# Patient Record
Sex: FEMALE | Race: WHITE | NOT HISPANIC OR LATINO | Employment: OTHER | URBAN - METROPOLITAN AREA
[De-identification: names, ages, dates, MRNs, and addresses within clinical notes are randomized per-mention and may not be internally consistent; named-entity substitution may affect disease eponyms.]

---

## 2017-05-09 ENCOUNTER — HOSPITAL ENCOUNTER (EMERGENCY)
Facility: HOSPITAL | Age: 77
Discharge: HOME | End: 2017-05-10
Attending: EMERGENCY MEDICINE | Admitting: EMERGENCY MEDICINE

## 2017-05-09 DIAGNOSIS — S96.911A RIGHT ANKLE STRAIN, INITIAL ENCOUNTER: Primary | ICD-10-CM

## 2017-05-09 DIAGNOSIS — S39.012A BACK STRAIN, INITIAL ENCOUNTER: ICD-10-CM

## 2017-05-09 PROCEDURE — 99284 EMERGENCY DEPT VISIT MOD MDM: CPT

## 2017-05-10 ENCOUNTER — APPOINTMENT (OUTPATIENT)
Dept: CT IMAGING | Facility: HOSPITAL | Age: 77
End: 2017-05-10

## 2017-05-10 ENCOUNTER — APPOINTMENT (OUTPATIENT)
Dept: GENERAL RADIOLOGY | Facility: HOSPITAL | Age: 77
End: 2017-05-10

## 2017-05-10 VITALS
RESPIRATION RATE: 18 BRPM | SYSTOLIC BLOOD PRESSURE: 155 MMHG | HEART RATE: 86 BPM | HEIGHT: 61 IN | OXYGEN SATURATION: 99 % | WEIGHT: 293 LBS | DIASTOLIC BLOOD PRESSURE: 70 MMHG | BODY MASS INDEX: 55.32 KG/M2 | TEMPERATURE: 97.4 F

## 2017-05-10 PROCEDURE — 72110 X-RAY EXAM L-2 SPINE 4/>VWS: CPT

## 2017-05-10 PROCEDURE — 72131 CT LUMBAR SPINE W/O DYE: CPT

## 2017-05-10 PROCEDURE — 73610 X-RAY EXAM OF ANKLE: CPT

## 2017-05-10 RX ORDER — ASPIRIN 81 MG/1
81 TABLET ORAL DAILY
COMMUNITY

## 2017-05-10 RX ORDER — VALACYCLOVIR HYDROCHLORIDE 500 MG/1
1000 TABLET, FILM COATED ORAL 2 TIMES DAILY
COMMUNITY
End: 2019-09-17 | Stop reason: ALTCHOICE

## 2017-05-10 RX ORDER — FUROSEMIDE 20 MG/1
20 TABLET ORAL DAILY
COMMUNITY

## 2017-05-10 RX ORDER — ATORVASTATIN CALCIUM 20 MG/1
20 TABLET, FILM COATED ORAL DAILY
COMMUNITY

## 2017-05-10 RX ORDER — GABAPENTIN 100 MG/1
100 CAPSULE ORAL NIGHTLY
COMMUNITY

## 2017-05-10 RX ORDER — FLECAINIDE ACETATE 100 MG/1
100 TABLET ORAL
COMMUNITY

## 2017-05-10 RX ORDER — INSULIN GLARGINE 100 [IU]/ML
38 INJECTION, SOLUTION SUBCUTANEOUS NIGHTLY
COMMUNITY
End: 2019-09-17

## 2017-05-10 RX ORDER — HYDROCODONE BITARTRATE AND ACETAMINOPHEN 5; 325 MG/1; MG/1
1 TABLET ORAL EVERY 6 HOURS PRN
COMMUNITY
End: 2019-09-17

## 2017-05-10 RX ORDER — LISINOPRIL 10 MG/1
10 TABLET ORAL DAILY
COMMUNITY

## 2017-05-10 RX ORDER — POLYETHYLENE GLYCOL 3350 17 G/17G
17 POWDER, FOR SOLUTION ORAL DAILY
COMMUNITY
End: 2019-09-17 | Stop reason: ALTCHOICE

## 2017-05-10 RX ORDER — HYDROXYZINE HYDROCHLORIDE 10 MG/1
10 TABLET, FILM COATED ORAL 3 TIMES DAILY PRN
COMMUNITY
End: 2019-09-17 | Stop reason: ALTCHOICE

## 2017-05-10 RX ORDER — TRAMADOL HYDROCHLORIDE 50 MG/1
50 TABLET ORAL EVERY 6 HOURS PRN
COMMUNITY
End: 2019-09-17 | Stop reason: ALTCHOICE

## 2017-05-10 RX ORDER — MELOXICAM 7.5 MG/1
7.5 TABLET ORAL DAILY
COMMUNITY
End: 2019-09-17 | Stop reason: ALTCHOICE

## 2018-05-15 ENCOUNTER — APPOINTMENT (OUTPATIENT)
Dept: GENERAL RADIOLOGY | Facility: HOSPITAL | Age: 78
End: 2018-05-15

## 2018-05-15 ENCOUNTER — HOSPITAL ENCOUNTER (EMERGENCY)
Facility: HOSPITAL | Age: 78
Discharge: HOME | End: 2018-05-15
Admitting: EMERGENCY MEDICINE

## 2018-05-15 VITALS
OXYGEN SATURATION: 95 % | BODY MASS INDEX: 53.05 KG/M2 | HEART RATE: 69 BPM | DIASTOLIC BLOOD PRESSURE: 64 MMHG | TEMPERATURE: 97.6 F | SYSTOLIC BLOOD PRESSURE: 115 MMHG | WEIGHT: 281 LBS | RESPIRATION RATE: 16 BRPM | HEIGHT: 61 IN

## 2018-05-15 DIAGNOSIS — S90.31XA CONTUSION OF RIGHT FOOT, INITIAL ENCOUNTER: Primary | ICD-10-CM

## 2018-05-15 PROCEDURE — 73630 X-RAY EXAM OF FOOT: CPT

## 2018-05-15 PROCEDURE — 99284 EMERGENCY DEPT VISIT MOD MDM: CPT

## 2018-05-15 RX ORDER — HYDROCODONE BITARTRATE AND ACETAMINOPHEN 5; 325 MG/1; MG/1
1 TABLET ORAL ONCE
Status: DISCONTINUED | OUTPATIENT
Start: 2018-05-15 | End: 2018-05-15 | Stop reason: HOSPADM

## 2018-05-15 NOTE — PROGRESS NOTES
Discharge Planning Assessment   [facility]     Patient Name: Nancy Fournier  MRN: [id number]  Today's Date: 5/15/2018    Admit Date: 5/15/2018          Discharge Needs Assessment     Row Name 05/15/18 1407       Living Environment    Primary Care Provided by homecare agency;self    Provides Primary Care For no one, unable/limited ability to care for self    Quality of Family Relationships involved    Able to Return to Prior Arrangements no       Resource/Environmental Concerns    Transportation Concerns other (see comments)   [facility] transport ambulance       Transition Planning    Patient/Family Anticipates Transition to inpatient rehabilitation facility    Patient/Family Anticipated Services at Transition skilled nursing    Transportation Anticipated health plan transportation   [facility]       Discharge Needs Assessment    Readmission Within the Last 30 Days no previous admission in last 30 days    Concerns to be Addressed discharge planning;home safety    Equipment Currently Used at Home wheelchair;walker, standard;lift device    Anticipated Changes Related to Illness inability to care for self    Outpatient/Agency/Support Group Needs skilled nursing facility    Discharge Facility/Level of Care Needs nursing facility, skilled    Offered/Gave Vendor List no    Patient's Choice of Community Agency(s) Patient requests to be transferred to SNF short term    Current Discharge Risk dependent with mobility/activities of daily living            Discharge Plan     Row Name 05/15/18 1411       Plan    Plan Pt to be transported to [facility] via EMS    Patient/Family in Agreement with Plan yes    Final Discharge Disposition Code 03 - skilled nursing facility (SNF)        Destination     No service coordination in this encounter.      Durable Medical Equipment     No service coordination in this encounter.      Dialysis/Infusion     No service coordination in this encounter.      Home Medical Care      No service coordination in this encounter.      Social Care     No service coordination in this encounter.                Demographic Summary    No documentation.           Functional Status    No documentation.           Psychosocial    No documentation.           Abuse/Neglect    No documentation.           Legal    No documentation.           Substance Abuse    No documentation.           Patient Forms    No documentation.         Herminia Carbone RN

## 2018-05-15 NOTE — ED PROVIDER NOTES
Pt presents to the ED c/o R foot pain s/p it slipping and forcefully striking the ground last night.  Pt reports weight bearing exacerbates the pain.    On exam, pt is A+Ox3 and in NAD.  Pt's R foot has mild TTP over R lateral heel with no deformity.  Pt's cardio has intact distal pulses.    XR R Foot shows osteopenia, NAD.  Plan for discharge.    MD ATTESTATION NOTE    The SERJIO and I have discussed this patient's history, physical exam, and treatment plan.  I have reviewed the documentation and personally had a face to face interaction with the patient. I affirm the documentation and agree with the treatment and plan.  The attached note describes my personal findings.    Documentation assistance provided by esme Chacon for Dr. Auguste. Information recorded by the scribe was done at my direction and has been verified and validated by me.     Carmen Chacon  05/15/18 1012       Feroz Auguste MD  05/16/18 8942

## 2018-05-15 NOTE — ED NOTES
Report given to Ghislaine at [facility]     Elinor Zendejas, ODALIS  05/15/18 9855

## 2018-05-15 NOTE — ED PROVIDER NOTES
EMERGENCY DEPARTMENT ENCOUNTER    CHIEF COMPLAINT  Chief Complaint: foot pain  History given by: patient  History limited by: N/A  Room Number: 19/19  PMD: Kimberli Silva MD      HPI:  Pt is a 77 y.o. female who is a nursing home resident. She presents to the ED c/o foot pain. She states that last night, as she was using her left foot to take off the shoe from her right foot, her right foot slipped out of the shoe and struck the ground. Since then, she has had intermittent right plantar foot pain that is exacerbated by bearing weight on RLE. She denies falling, right ankle pain, sensory loss, fevers, and sustaining any other injury. Nursing home staff applied ace wrap to pt's right foot. Pt states that she is currently undergoing treatment for chronic groin infection. Pt has no other complaints at this time.     Duration: started last night  Timing: intermittent  Location: right plantar foot  Radiation: none  Quality: pain  Intensity/Severity: moderate  Progression: unchanged  Associated Symptoms: none  Aggravating Factors: bearing weight on RLE  Alleviating Factors: keeping weight off of RLE  Previous Episodes: none mentioned  Treatment before arrival: Nursing home staff applied ace wrap to pt's right foot.        PAST MEDICAL HISTORY  Active Ambulatory Problems     Diagnosis Date Noted   • No Active Ambulatory Problems     Resolved Ambulatory Problems     Diagnosis Date Noted   • No Resolved Ambulatory Problems     Past Medical History:   Diagnosis Date   • A-fib    • Chronic back pain    • Depression    • Diabetes mellitus    • Hyperlipidemia    • Hypertension    • Osteoarthritis        PAST SURGICAL HISTORY  Past Surgical History:   Procedure Laterality Date   • CHOLECYSTECTOMY     • HERNIA REPAIR     • SHOULDER SURGERY     • TONSILLECTOMY         FAMILY HISTORY  History reviewed. No pertinent family history.    SOCIAL HISTORY  Social History     Social History   • Marital status:      Spouse name: N/A   •  Number of children: N/A   • Years of education: N/A     Occupational History   • Not on file.     Social History Main Topics   • Smoking status: Former Smoker   • Smokeless tobacco: Not on file   • Alcohol use No   • Drug use: No   • Sexual activity: Defer     Other Topics Concern   • Not on file     Social History Narrative   • No narrative on file         ALLERGIES  Ceclor [cefaclor]        REVIEW OF SYSTEMS  Review of Systems   Constitutional: Negative for chills and fever.   HENT: Negative for sore throat.    Respiratory: Negative for shortness of breath.    Cardiovascular: Negative for chest pain.   Gastrointestinal: Negative for nausea and vomiting.   Genitourinary: Negative for dysuria.   Musculoskeletal: Negative for back pain.        Right plantar foot pain   Neurological: Negative for numbness.   Psychiatric/Behavioral: The patient is not nervous/anxious.    All other systems reviewed and are negative.          PHYSICAL EXAM  ED Triage Vitals   Temp Heart Rate Resp BP SpO2   05/15/18 0903 05/15/18 0846 05/15/18 0903 05/15/18 0846 05/15/18 0846   97.6 °F (36.4 °C) 72 16 150/74 96 % WNL      Temp src Heart Rate Source Patient Position BP Location FiO2 (%)   05/15/18 0903 -- -- -- --   Tympanic           Physical Exam   Constitutional: She is oriented to person, place, and time. No distress.   HENT:   Head: Normocephalic and atraumatic.   Neck: Normal range of motion. Neck supple.   Cardiovascular: Normal rate, regular rhythm and normal heart sounds.    Pulses:       Dorsalis pedis pulses are 2+ on the right side.        Posterior tibial pulses are 2+ on the right side.   Pulmonary/Chest: Effort normal and breath sounds normal. No respiratory distress.   Musculoskeletal:        Right ankle: She exhibits normal range of motion and no swelling. No tenderness.        Right foot: There is normal range of motion, no tenderness and no swelling.   NV intact distally to RLE   Neurological: She is alert and oriented  to person, place, and time. She has normal motor skills and normal sensation.   Skin: Skin is warm and dry.   Nursing note and vitals reviewed.        RADIOLOGY     XR Foot 3+ View Right (Preliminary result) Result time: 05/15/18 09:46:17   Preliminary result by Darin Ordaz MD (05/15/18 09:46:17)   Impression:   Osteopenia. Chronic changes without evidence for fracture or  acute abnormality.      Narrative:   RIGHT FOOT: 3 VIEWS     HISTORY: Fall yesterday with right lateral foot pain.     FINDINGS: Midfoot alignment appears normal. The bones are osteopenic.  Chronic degenerative changes are present in the 1st MTP joint where  there is spur formation. No fracture or acute osseous abnormality is  evident. 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 Degenerative heel spurs are  present posteriorly and inferiorly. Microvascular calcifications are  noted.            I ordered the above noted radiological studies and reviewed the images on the PACS system.        MEDICAL RECORD REVIEW        PROCEDURES        PROGRESS AND CONSULTS  0906- Ordered norco for pain. Ordered right foot xray for further evaluation.     0952- Reviewed pt's history and workup with Dr. Auguste (ER physician). After a bedside evaluation, Dr. Auguste agrees with the plan of care.    0955- Rechecked pt. She is resting comfortably and is in no acute distress. Discussed with pt about right foot xray findings (osteopenia, no fracture), diagnosis, and plan for discharge. Instructed to f/u with PMD for recheck. RTER warnings given. Pt understands and agrees with plan. Addressed all questions.         COURSE & MEDICAL DECISION MAKING  Pertinent Imaging studies that were ordered and reviewed are noted above.  Results were reviewed/discussed with the patient and they were also made aware of online assess.   Pt also made aware that some labs, such as cultures, will not be resulted during ER visit and follow up with PMD is necessary.         MEDICATIONS GIVEN IN  "ER  Medications   HYDROcodone-acetaminophen (NORCO) 5-325 MG per tablet 1 tablet (1 tablet Oral Not Given 5/15/18 0951)             DISPOSITION VITALS  /74   Pulse 72   Temp 97.6 °F (36.4 °C) (Tympanic)   Resp 16   Ht 154.9 cm (61\")   Wt 127 kg (281 lb)   SpO2 96%   BMI 53.09 kg/m²           DISPOSITION  DISCHARGE    Discussed all results and noted any abnormalities with patient.  Discussed absoute need to recheck abnormalities and condition with PMD.     Reviewed implications of results, diagnosis, meds, responsibility to follow up, warning signs and symptoms of possible worsening, potential complications and reasons to return to ER with patient    Discussed plan for discharge, as there is no emergent indication for admission.  Pt is agreeable and understands need for follow up and repeat testing.  Pt is aware that discharge does not mean that nothing is wrong but it indicates no emergency is present and they must continue care with PMD.  Pt is discharged with instructions to follow up with primary care doctor to have their blood pressure rechecked.       DIAGNOSIS  Final diagnoses:   Contusion of right foot, initial encounter       FOLLOW UP   Kimberli Silva MD  [address] [place] [address]  [phone]    Schedule an appointment as soon as possible for a visit         I personally reviewed the past medical history, past surgical history, social history, family history, current medications and allergies as they appear in this chart.  The scribe's note accurately reflects the work and decisions made by me.           Documentation assistance provided by esme Garcia for ARIC Norris.  Information recorded by the scribe was done at my direction and has been verified and validated by me.     Jin Garcia  05/15/18 1020       ARIC Larson  05/15/18 1343    "

## 2019-09-17 ENCOUNTER — APPOINTMENT (OUTPATIENT)
Dept: CT IMAGING | Facility: HOSPITAL | Age: 79
End: 2019-09-17

## 2019-09-17 ENCOUNTER — APPOINTMENT (OUTPATIENT)
Dept: GENERAL RADIOLOGY | Facility: HOSPITAL | Age: 79
End: 2019-09-17

## 2019-09-17 ENCOUNTER — HOSPITAL ENCOUNTER (INPATIENT)
Facility: HOSPITAL | Age: 79
LOS: 6 days | Discharge: NURSING, REHAB OR LONG-TERM CARE | End: 2019-09-23
Attending: EMERGENCY MEDICINE | Admitting: INTERNAL MEDICINE

## 2019-09-17 DIAGNOSIS — S86.911A STRAIN OF KNEE, BILATERAL: ICD-10-CM
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
## 2019-09-17 DIAGNOSIS — D72.829 LEUKOCYTOSIS, UNSPECIFIED TYPE: ICD-10-CM

## 2019-09-17 DIAGNOSIS — S76.011A HIP STRAIN, RIGHT, INITIAL ENCOUNTER: ICD-10-CM

## 2019-09-17 DIAGNOSIS — M54.5 ACUTE LOW BACK PAIN, UNSPECIFIED BACK PAIN LATERALITY, WITH SCIATICA PRESENCE UNSPECIFIED: ICD-10-CM

## 2019-09-17 DIAGNOSIS — R11.0 NAUSEA: ICD-10-CM

## 2019-09-17 DIAGNOSIS — R29.898 COMPLAINTS OF LEG WEAKNESS: ICD-10-CM

## 2019-09-17 DIAGNOSIS — S86.912A STRAIN OF KNEE, BILATERAL: ICD-10-CM

## 2019-09-17 PROBLEM — M54.9 CHRONIC BACK PAIN: Status: ACTIVE | Noted: 2019-09-17

## 2019-09-17 PROBLEM — I10 HYPERTENSION: Status: ACTIVE | Noted: 2019-09-17

## 2019-09-17 PROBLEM — I48.91 A-FIB (HCC): Status: ACTIVE | Noted: 2019-09-17

## 2019-09-17 PROBLEM — E11.9 DIABETES MELLITUS (HCC): Status: ACTIVE | Noted: 2019-09-17

## 2019-09-17 PROBLEM — M54.9 ACUTE BACK PAIN: Status: ACTIVE | Noted: 2019-09-17

## 2019-09-17 PROBLEM — G89.29 CHRONIC BACK PAIN: Status: ACTIVE | Noted: 2019-09-17

## 2019-09-17 PROBLEM — T07.XXXA WOUNDS, MULTIPLE: Status: ACTIVE | Noted: 2019-09-17

## 2019-09-17 LAB
ALBUMIN SERPL-MCNC: 3.9 G/DL (ref 3.5–5.2)
ALBUMIN/GLOB SERPL: 1.6 G/DL
ALP SERPL-CCNC: 104 U/L (ref 39–117)
ALT SERPL W P-5'-P-CCNC: 19 U/L (ref 1–33)
ANION GAP SERPL CALCULATED.3IONS-SCNC: 12.7 MMOL/L (ref 5–15)
AST SERPL-CCNC: 23 U/L (ref 1–32)
BACTERIA UR QL AUTO: ABNORMAL /HPF
BASOPHILS # BLD AUTO: 0.05 10*3/MM3 (ref 0–0.2)
BASOPHILS NFR BLD AUTO: 0.3 % (ref 0–1.5)
BILIRUB SERPL-MCNC: 0.6 MG/DL (ref 0.2–1.2)
BILIRUB UR QL STRIP: NEGATIVE
BUN BLD-MCNC: 31 MG/DL (ref 8–23)
BUN/CREAT SERPL: 38.8 (ref 7–25)
CALCIUM SPEC-SCNC: 9.2 MG/DL (ref 8.6–10.5)
CHLORIDE SERPL-SCNC: 98 MMOL/L (ref 98–107)
CLARITY UR: ABNORMAL
CO2 SERPL-SCNC: 31.3 MMOL/L (ref 22–29)
COLOR UR: YELLOW
CREAT BLD-MCNC: 0.8 MG/DL (ref 0.57–1)
DEPRECATED RDW RBC AUTO: 49 FL (ref 37–54)
EOSINOPHIL # BLD AUTO: 0.05 10*3/MM3 (ref 0–0.4)
EOSINOPHIL NFR BLD AUTO: 0.3 % (ref 0.3–6.2)
ERYTHROCYTE [DISTWIDTH] IN BLOOD BY AUTOMATED COUNT: 13.4 % (ref 12.3–15.4)
GFR SERPL CREATININE-BSD FRML MDRD: 69 ML/MIN/1.73
GLOBULIN UR ELPH-MCNC: 2.5 GM/DL
GLUCOSE BLD-MCNC: 161 MG/DL (ref 65–99)
GLUCOSE BLDC GLUCOMTR-MCNC: 167 MG/DL (ref 70–130)
GLUCOSE BLDC GLUCOMTR-MCNC: 225 MG/DL (ref 70–130)
GLUCOSE UR STRIP-MCNC: NEGATIVE MG/DL
HCT VFR BLD AUTO: 43.5 % (ref 34–46.6)
HGB BLD-MCNC: 13.8 G/DL (ref 12–15.9)
HGB UR QL STRIP.AUTO: ABNORMAL
HYALINE CASTS UR QL AUTO: ABNORMAL /LPF
IMM GRANULOCYTES # BLD AUTO: 0.14 10*3/MM3 (ref 0–0.05)
IMM GRANULOCYTES NFR BLD AUTO: 0.9 % (ref 0–0.5)
KETONES UR QL STRIP: NEGATIVE
LEUKOCYTE ESTERASE UR QL STRIP.AUTO: NEGATIVE
LYMPHOCYTES # BLD AUTO: 1.34 10*3/MM3 (ref 0.7–3.1)
LYMPHOCYTES NFR BLD AUTO: 8.2 % (ref 19.6–45.3)
MAGNESIUM SERPL-MCNC: 1.8 MG/DL (ref 1.6–2.4)
MCH RBC QN AUTO: 31.4 PG (ref 26.6–33)
MCHC RBC AUTO-ENTMCNC: 31.7 G/DL (ref 31.5–35.7)
MCV RBC AUTO: 98.9 FL (ref 79–97)
MONOCYTES # BLD AUTO: 1.22 10*3/MM3 (ref 0.1–0.9)
MONOCYTES NFR BLD AUTO: 7.5 % (ref 5–12)
NEUTROPHILS # BLD AUTO: 13.53 10*3/MM3 (ref 1.7–7)
NEUTROPHILS NFR BLD AUTO: 82.8 % (ref 42.7–76)
NITRITE UR QL STRIP: NEGATIVE
NRBC BLD AUTO-RTO: 0 /100 WBC (ref 0–0.2)
PH UR STRIP.AUTO: 8.5 [PH] (ref 5–8)
PLATELET # BLD AUTO: 258 10*3/MM3 (ref 140–450)
PMV BLD AUTO: 10.3 FL (ref 6–12)
POTASSIUM BLD-SCNC: 4.4 MMOL/L (ref 3.5–5.2)
PROT SERPL-MCNC: 6.4 G/DL (ref 6–8.5)
PROT UR QL STRIP: NEGATIVE
RBC # BLD AUTO: 4.4 10*6/MM3 (ref 3.77–5.28)
RBC # UR: ABNORMAL /HPF
REF LAB TEST METHOD: ABNORMAL
SODIUM BLD-SCNC: 142 MMOL/L (ref 136–145)
SP GR UR STRIP: 1.02 (ref 1–1.03)
SQUAMOUS #/AREA URNS HPF: ABNORMAL /HPF
UROBILINOGEN UR QL STRIP: ABNORMAL
WBC NRBC COR # BLD: 16.33 10*3/MM3 (ref 3.4–10.8)
WBC UR QL AUTO: ABNORMAL /HPF

## 2019-09-17 PROCEDURE — 73523 X-RAY EXAM HIPS BI 5/> VIEWS: CPT

## 2019-09-17 PROCEDURE — 81001 URINALYSIS AUTO W/SCOPE: CPT | Performed by: EMERGENCY MEDICINE

## 2019-09-17 PROCEDURE — 82962 GLUCOSE BLOOD TEST: CPT

## 2019-09-17 PROCEDURE — 25010000002 HYDROMORPHONE PER 4 MG: Performed by: EMERGENCY MEDICINE

## 2019-09-17 PROCEDURE — 25010000002 HYDROMORPHONE PER 4 MG: Performed by: INTERNAL MEDICINE

## 2019-09-17 PROCEDURE — 93010 ELECTROCARDIOGRAM REPORT: CPT | Performed by: INTERNAL MEDICINE

## 2019-09-17 PROCEDURE — 72110 X-RAY EXAM L-2 SPINE 4/>VWS: CPT

## 2019-09-17 PROCEDURE — P9612 CATHETERIZE FOR URINE SPEC: HCPCS

## 2019-09-17 PROCEDURE — 99285 EMERGENCY DEPT VISIT HI MDM: CPT

## 2019-09-17 PROCEDURE — 85025 COMPLETE CBC W/AUTO DIFF WBC: CPT | Performed by: EMERGENCY MEDICINE

## 2019-09-17 PROCEDURE — 73560 X-RAY EXAM OF KNEE 1 OR 2: CPT

## 2019-09-17 PROCEDURE — 93005 ELECTROCARDIOGRAM TRACING: CPT | Performed by: EMERGENCY MEDICINE

## 2019-09-17 PROCEDURE — 83735 ASSAY OF MAGNESIUM: CPT | Performed by: EMERGENCY MEDICINE

## 2019-09-17 PROCEDURE — 25010000002 ONDANSETRON PER 1 MG: Performed by: EMERGENCY MEDICINE

## 2019-09-17 PROCEDURE — 80053 COMPREHEN METABOLIC PANEL: CPT | Performed by: EMERGENCY MEDICINE

## 2019-09-17 RX ORDER — ONDANSETRON 4 MG/1
4 TABLET, FILM COATED ORAL EVERY 8 HOURS PRN
COMMUNITY

## 2019-09-17 RX ORDER — NICOTINE POLACRILEX 4 MG
15 LOZENGE BUCCAL
Status: DISCONTINUED | OUTPATIENT
Start: 2019-09-17 | End: 2019-09-23 | Stop reason: HOSPADM

## 2019-09-17 RX ORDER — LORAZEPAM 0.5 MG/1
0.5 TABLET ORAL EVERY 8 HOURS PRN
Status: ON HOLD | COMMUNITY
Start: 2019-09-10 | End: 2019-09-23

## 2019-09-17 RX ORDER — BETAMETHASONE DIPROPIONATE 0.5 MG/G
1 CREAM TOPICAL 2 TIMES DAILY
COMMUNITY

## 2019-09-17 RX ORDER — ASPIRIN 81 MG/1
81 TABLET ORAL DAILY
Status: DISCONTINUED | OUTPATIENT
Start: 2019-09-18 | End: 2019-09-23 | Stop reason: HOSPADM

## 2019-09-17 RX ORDER — BETAMETHASONE DIPROPIONATE 0.05 %
OINTMENT (GRAM) TOPICAL 2 TIMES DAILY
COMMUNITY
End: 2019-09-17 | Stop reason: ALTCHOICE

## 2019-09-17 RX ORDER — SODIUM CHLORIDE 0.9 % (FLUSH) 0.9 %
10 SYRINGE (ML) INJECTION EVERY 12 HOURS SCHEDULED
Status: DISCONTINUED | OUTPATIENT
Start: 2019-09-17 | End: 2019-09-23 | Stop reason: HOSPADM

## 2019-09-17 RX ORDER — HYDROMORPHONE HYDROCHLORIDE 1 MG/ML
0.5 INJECTION, SOLUTION INTRAMUSCULAR; INTRAVENOUS; SUBCUTANEOUS ONCE
Status: COMPLETED | OUTPATIENT
Start: 2019-09-17 | End: 2019-09-17

## 2019-09-17 RX ORDER — PSEUDOEPHEDRINE HCL 30 MG
2 TABLET ORAL 2 TIMES DAILY
COMMUNITY

## 2019-09-17 RX ORDER — BUPROPION HYDROCHLORIDE 75 MG/1
150 TABLET ORAL DAILY
COMMUNITY

## 2019-09-17 RX ORDER — SODIUM CHLORIDE 0.9 % (FLUSH) 0.9 %
10 SYRINGE (ML) INJECTION AS NEEDED
Status: DISCONTINUED | OUTPATIENT
Start: 2019-09-17 | End: 2019-09-23 | Stop reason: HOSPADM

## 2019-09-17 RX ORDER — FLECAINIDE ACETATE 50 MG/1
100 TABLET ORAL NIGHTLY
Status: DISCONTINUED | OUTPATIENT
Start: 2019-09-17 | End: 2019-09-23 | Stop reason: HOSPADM

## 2019-09-17 RX ORDER — SERTRALINE HYDROCHLORIDE 100 MG/1
150 TABLET, FILM COATED ORAL DAILY
COMMUNITY

## 2019-09-17 RX ORDER — CASTOR OIL AND BALSAM, PERU 788; 87 MG/G; MG/G
OINTMENT TOPICAL 2 TIMES DAILY PRN
COMMUNITY

## 2019-09-17 RX ORDER — FEXOFENADINE HYDROCHLORIDE 60 MG/1
60 TABLET, FILM COATED ORAL DAILY
COMMUNITY

## 2019-09-17 RX ORDER — LISINOPRIL 10 MG/1
10 TABLET ORAL DAILY
Status: DISCONTINUED | OUTPATIENT
Start: 2019-09-18 | End: 2019-09-23 | Stop reason: HOSPADM

## 2019-09-17 RX ORDER — HYDROCODONE BITARTRATE AND ACETAMINOPHEN 7.5; 325 MG/1; MG/1
1 TABLET ORAL ONCE
Status: COMPLETED | OUTPATIENT
Start: 2019-09-17 | End: 2019-09-17

## 2019-09-17 RX ORDER — ATORVASTATIN CALCIUM 20 MG/1
20 TABLET, FILM COATED ORAL DAILY
Status: DISCONTINUED | OUTPATIENT
Start: 2019-09-18 | End: 2019-09-23 | Stop reason: HOSPADM

## 2019-09-17 RX ORDER — ONDANSETRON 2 MG/ML
4 INJECTION INTRAMUSCULAR; INTRAVENOUS ONCE
Status: COMPLETED | OUTPATIENT
Start: 2019-09-17 | End: 2019-09-17

## 2019-09-17 RX ORDER — BUPROPION HYDROCHLORIDE 75 MG/1
150 TABLET ORAL DAILY
Status: DISCONTINUED | OUTPATIENT
Start: 2019-09-18 | End: 2019-09-23 | Stop reason: HOSPADM

## 2019-09-17 RX ORDER — NUT.TX.GLUCOSE INTOLERANCE,SOY
30 BAR ORAL 3 TIMES DAILY
COMMUNITY

## 2019-09-17 RX ORDER — FLUTICASONE PROPIONATE 50 MCG
2 SPRAY, SUSPENSION (ML) NASAL DAILY
Status: DISCONTINUED | OUTPATIENT
Start: 2019-09-18 | End: 2019-09-23 | Stop reason: HOSPADM

## 2019-09-17 RX ORDER — TROLAMINE SALICYLATE 10 G/100G
CREAM TOPICAL AS NEEDED
COMMUNITY
End: 2019-09-17

## 2019-09-17 RX ORDER — LORAZEPAM 0.5 MG/1
0.5 TABLET ORAL EVERY 8 HOURS PRN
Status: DISCONTINUED | OUTPATIENT
Start: 2019-09-17 | End: 2019-09-23 | Stop reason: HOSPADM

## 2019-09-17 RX ORDER — HYDROCODONE BITARTRATE AND ACETAMINOPHEN 7.5; 325 MG/1; MG/1
1 TABLET ORAL 3 TIMES DAILY
COMMUNITY
End: 2019-09-23 | Stop reason: HOSPADM

## 2019-09-17 RX ORDER — HYDROCODONE BITARTRATE AND ACETAMINOPHEN 7.5; 325 MG/1; MG/1
1 TABLET ORAL EVERY 6 HOURS PRN
Status: DISCONTINUED | OUTPATIENT
Start: 2019-09-17 | End: 2019-09-19

## 2019-09-17 RX ORDER — FUROSEMIDE 20 MG/1
20 TABLET ORAL DAILY
Status: DISCONTINUED | OUTPATIENT
Start: 2019-09-18 | End: 2019-09-23 | Stop reason: HOSPADM

## 2019-09-17 RX ORDER — ONDANSETRON 4 MG/1
4 TABLET, ORALLY DISINTEGRATING ORAL ONCE
Status: DISCONTINUED | OUTPATIENT
Start: 2019-09-17 | End: 2019-09-17

## 2019-09-17 RX ORDER — DEXTROSE MONOHYDRATE 25 G/50ML
25 INJECTION, SOLUTION INTRAVENOUS
Status: DISCONTINUED | OUTPATIENT
Start: 2019-09-17 | End: 2019-09-23 | Stop reason: HOSPADM

## 2019-09-17 RX ORDER — SODIUM CHLORIDE 9 MG/ML
50 INJECTION, SOLUTION INTRAVENOUS CONTINUOUS
Status: DISCONTINUED | OUTPATIENT
Start: 2019-09-17 | End: 2019-09-23 | Stop reason: HOSPADM

## 2019-09-17 RX ORDER — TRIAMCINOLONE ACETONIDE 1 MG/G
CREAM TOPICAL EVERY 12 HOURS SCHEDULED
Status: DISCONTINUED | OUTPATIENT
Start: 2019-09-17 | End: 2019-09-23 | Stop reason: HOSPADM

## 2019-09-17 RX ORDER — ONDANSETRON 2 MG/ML
4 INJECTION INTRAMUSCULAR; INTRAVENOUS EVERY 6 HOURS PRN
Status: DISCONTINUED | OUTPATIENT
Start: 2019-09-17 | End: 2019-09-23 | Stop reason: HOSPADM

## 2019-09-17 RX ORDER — FLUTICASONE PROPIONATE 50 MCG
2 SPRAY, SUSPENSION (ML) NASAL DAILY
COMMUNITY

## 2019-09-17 RX ORDER — ARGININE/GLUTAMINE/CALCIUM BMB 7G-7G-1.5G
1 POWDER IN PACKET (EA) ORAL 3 TIMES DAILY
COMMUNITY

## 2019-09-17 RX ORDER — INSULIN GLARGINE 100 [IU]/ML
38 INJECTION, SOLUTION SUBCUTANEOUS NIGHTLY
Status: DISCONTINUED | OUTPATIENT
Start: 2019-09-17 | End: 2019-09-23 | Stop reason: HOSPADM

## 2019-09-17 RX ORDER — OLOPATADINE HYDROCHLORIDE 1 MG/ML
1 SOLUTION/ DROPS OPHTHALMIC DAILY
COMMUNITY

## 2019-09-17 RX ORDER — METOPROLOL SUCCINATE 25 MG/1
25 TABLET, EXTENDED RELEASE ORAL
Status: DISCONTINUED | OUTPATIENT
Start: 2019-09-18 | End: 2019-09-21

## 2019-09-17 RX ORDER — HYDROMORPHONE HYDROCHLORIDE 1 MG/ML
0.5 INJECTION, SOLUTION INTRAMUSCULAR; INTRAVENOUS; SUBCUTANEOUS
Status: DISCONTINUED | OUTPATIENT
Start: 2019-09-17 | End: 2019-09-23 | Stop reason: HOSPADM

## 2019-09-17 RX ORDER — ACETAMINOPHEN 500 MG
500 TABLET ORAL EVERY 4 HOURS PRN
COMMUNITY

## 2019-09-17 RX ORDER — ASPIRIN 81 MG
1 TABLET, DELAYED RELEASE (ENTERIC COATED) ORAL DAILY
COMMUNITY

## 2019-09-17 RX ORDER — GABAPENTIN 100 MG/1
100 CAPSULE ORAL NIGHTLY
Status: DISCONTINUED | OUTPATIENT
Start: 2019-09-17 | End: 2019-09-23 | Stop reason: HOSPADM

## 2019-09-17 RX ORDER — CICLOPIROX OLAMINE 7.7 MG/100ML
SUSPENSION TOPICAL EVERY 12 HOURS SCHEDULED
COMMUNITY
End: 2019-09-17 | Stop reason: ALTCHOICE

## 2019-09-17 RX ORDER — METFORMIN HYDROCHLORIDE 500 MG/1
2000 TABLET, EXTENDED RELEASE ORAL DAILY
COMMUNITY

## 2019-09-17 RX ORDER — METOPROLOL SUCCINATE 25 MG/1
25 TABLET, EXTENDED RELEASE ORAL 2 TIMES DAILY
COMMUNITY
End: 2019-09-23 | Stop reason: HOSPADM

## 2019-09-17 RX ORDER — KETOTIFEN FUMARATE 0.35 MG/ML
1 SOLUTION/ DROPS OPHTHALMIC 2 TIMES DAILY
Status: DISCONTINUED | OUTPATIENT
Start: 2019-09-17 | End: 2019-09-23 | Stop reason: HOSPADM

## 2019-09-17 RX ORDER — HYDROCODONE BITARTRATE AND ACETAMINOPHEN 7.5; 325 MG/1; MG/1
1 TABLET ORAL EVERY 6 HOURS PRN
COMMUNITY
End: 2019-09-23 | Stop reason: HOSPADM

## 2019-09-17 RX ORDER — ACETAMINOPHEN 500 MG
500 TABLET ORAL EVERY 4 HOURS PRN
Status: DISCONTINUED | OUTPATIENT
Start: 2019-09-17 | End: 2019-09-23 | Stop reason: HOSPADM

## 2019-09-17 RX ADMIN — HYDROMORPHONE HYDROCHLORIDE 0.5 MG: 1 INJECTION, SOLUTION INTRAMUSCULAR; INTRAVENOUS; SUBCUTANEOUS at 17:06

## 2019-09-17 RX ADMIN — HYDROCODONE BITARTRATE AND ACETAMINOPHEN 1 TABLET: 7.5; 325 TABLET ORAL at 14:35

## 2019-09-17 RX ADMIN — HYDROMORPHONE HYDROCHLORIDE 0.5 MG: 10 INJECTION INTRAMUSCULAR; INTRAVENOUS; SUBCUTANEOUS at 21:55

## 2019-09-17 RX ADMIN — SODIUM CHLORIDE 250 ML: 9 INJECTION, SOLUTION INTRAVENOUS at 17:06

## 2019-09-17 RX ADMIN — ONDANSETRON 4 MG: 2 INJECTION INTRAMUSCULAR; INTRAVENOUS at 17:05

## 2019-09-17 RX ADMIN — SODIUM CHLORIDE 125 ML/HR: 9 INJECTION, SOLUTION INTRAVENOUS at 17:06

## 2019-09-18 ENCOUNTER — APPOINTMENT (OUTPATIENT)
Dept: CT IMAGING | Facility: HOSPITAL | Age: 79
End: 2019-09-18

## 2019-09-18 PROBLEM — S32.009A: Status: ACTIVE | Noted: 2019-09-18

## 2019-09-18 PROBLEM — E66.01 OBESITY, MORBID, BMI 50 OR HIGHER (HCC): Status: ACTIVE | Noted: 2019-09-18

## 2019-09-18 LAB
ALBUMIN SERPL-MCNC: 3.3 G/DL (ref 3.5–5.2)
ALBUMIN/GLOB SERPL: 1.5 G/DL
ALP SERPL-CCNC: 83 U/L (ref 39–117)
ALT SERPL W P-5'-P-CCNC: 17 U/L (ref 1–33)
ANION GAP SERPL CALCULATED.3IONS-SCNC: 9.3 MMOL/L (ref 5–15)
AST SERPL-CCNC: 20 U/L (ref 1–32)
BASOPHILS # BLD AUTO: 0.02 10*3/MM3 (ref 0–0.2)
BASOPHILS NFR BLD AUTO: 0.1 % (ref 0–1.5)
BILIRUB SERPL-MCNC: 0.5 MG/DL (ref 0.2–1.2)
BUN BLD-MCNC: 25 MG/DL (ref 8–23)
BUN/CREAT SERPL: 39.1 (ref 7–25)
CALCIUM SPEC-SCNC: 8.1 MG/DL (ref 8.6–10.5)
CHLORIDE SERPL-SCNC: 103 MMOL/L (ref 98–107)
CO2 SERPL-SCNC: 30.7 MMOL/L (ref 22–29)
CREAT BLD-MCNC: 0.64 MG/DL (ref 0.57–1)
DEPRECATED RDW RBC AUTO: 46.1 FL (ref 37–54)
EOSINOPHIL # BLD AUTO: 0 10*3/MM3 (ref 0–0.4)
EOSINOPHIL NFR BLD AUTO: 0 % (ref 0.3–6.2)
ERYTHROCYTE [DISTWIDTH] IN BLOOD BY AUTOMATED COUNT: 13.1 % (ref 12.3–15.4)
GFR SERPL CREATININE-BSD FRML MDRD: 89 ML/MIN/1.73
GLOBULIN UR ELPH-MCNC: 2.2 GM/DL
GLUCOSE BLD-MCNC: 239 MG/DL (ref 65–99)
GLUCOSE BLDC GLUCOMTR-MCNC: 177 MG/DL (ref 70–130)
GLUCOSE BLDC GLUCOMTR-MCNC: 188 MG/DL (ref 70–130)
GLUCOSE BLDC GLUCOMTR-MCNC: 210 MG/DL (ref 70–130)
GLUCOSE BLDC GLUCOMTR-MCNC: 229 MG/DL (ref 70–130)
HCT VFR BLD AUTO: 33.8 % (ref 34–46.6)
HGB BLD-MCNC: 10.7 G/DL (ref 12–15.9)
IMM GRANULOCYTES # BLD AUTO: 0.17 10*3/MM3 (ref 0–0.05)
IMM GRANULOCYTES NFR BLD AUTO: 1 % (ref 0–0.5)
LYMPHOCYTES # BLD AUTO: 0.96 10*3/MM3 (ref 0.7–3.1)
LYMPHOCYTES NFR BLD AUTO: 5.5 % (ref 19.6–45.3)
MCH RBC QN AUTO: 30.5 PG (ref 26.6–33)
MCHC RBC AUTO-ENTMCNC: 31.7 G/DL (ref 31.5–35.7)
MCV RBC AUTO: 96.3 FL (ref 79–97)
MONOCYTES # BLD AUTO: 1.7 10*3/MM3 (ref 0.1–0.9)
MONOCYTES NFR BLD AUTO: 9.7 % (ref 5–12)
NEUTROPHILS # BLD AUTO: 14.71 10*3/MM3 (ref 1.7–7)
NEUTROPHILS NFR BLD AUTO: 83.7 % (ref 42.7–76)
NRBC BLD AUTO-RTO: 0 /100 WBC (ref 0–0.2)
PLATELET # BLD AUTO: 238 10*3/MM3 (ref 140–450)
PMV BLD AUTO: 10.7 FL (ref 6–12)
POTASSIUM BLD-SCNC: 4.1 MMOL/L (ref 3.5–5.2)
PROT SERPL-MCNC: 5.5 G/DL (ref 6–8.5)
RBC # BLD AUTO: 3.51 10*6/MM3 (ref 3.77–5.28)
SODIUM BLD-SCNC: 143 MMOL/L (ref 136–145)
WBC NRBC COR # BLD: 17.56 10*3/MM3 (ref 3.4–10.8)

## 2019-09-18 PROCEDURE — 82962 GLUCOSE BLOOD TEST: CPT

## 2019-09-18 PROCEDURE — 72131 CT LUMBAR SPINE W/O DYE: CPT

## 2019-09-18 PROCEDURE — 85025 COMPLETE CBC W/AUTO DIFF WBC: CPT | Performed by: INTERNAL MEDICINE

## 2019-09-18 PROCEDURE — 25010000002 ONDANSETRON PER 1 MG: Performed by: INTERNAL MEDICINE

## 2019-09-18 PROCEDURE — 99223 1ST HOSP IP/OBS HIGH 75: CPT | Performed by: ORTHOPAEDIC SURGERY

## 2019-09-18 PROCEDURE — 25010000002 HYDROMORPHONE PER 4 MG: Performed by: INTERNAL MEDICINE

## 2019-09-18 PROCEDURE — 63710000001 INSULIN LISPRO (HUMAN) PER 5 UNITS: Performed by: INTERNAL MEDICINE

## 2019-09-18 PROCEDURE — 63710000001 INSULIN GLARGINE PER 5 UNITS: Performed by: INTERNAL MEDICINE

## 2019-09-18 PROCEDURE — 99221 1ST HOSP IP/OBS SF/LOW 40: CPT | Performed by: NURSE PRACTITIONER

## 2019-09-18 PROCEDURE — 80053 COMPREHEN METABOLIC PANEL: CPT | Performed by: INTERNAL MEDICINE

## 2019-09-18 RX ORDER — CASTOR OIL AND BALSAM, PERU 788; 87 MG/G; MG/G
OINTMENT TOPICAL EVERY 12 HOURS SCHEDULED
Status: DISCONTINUED | OUTPATIENT
Start: 2019-09-18 | End: 2019-09-23 | Stop reason: HOSPADM

## 2019-09-18 RX ADMIN — GABAPENTIN 100 MG: 100 CAPSULE ORAL at 20:37

## 2019-09-18 RX ADMIN — HYDROMORPHONE HYDROCHLORIDE 0.5 MG: 10 INJECTION INTRAMUSCULAR; INTRAVENOUS; SUBCUTANEOUS at 00:20

## 2019-09-18 RX ADMIN — SODIUM CHLORIDE 125 ML/HR: 9 INJECTION, SOLUTION INTRAVENOUS at 00:01

## 2019-09-18 RX ADMIN — HYDROMORPHONE HYDROCHLORIDE 0.5 MG: 10 INJECTION INTRAMUSCULAR; INTRAVENOUS; SUBCUTANEOUS at 13:45

## 2019-09-18 RX ADMIN — KETOTIFEN FUMARATE 1 DROP: 0.35 SOLUTION/ DROPS OPHTHALMIC at 20:46

## 2019-09-18 RX ADMIN — GLYCERIN 1 DROP: .002; .002; .01 SOLUTION/ DROPS OPHTHALMIC at 20:37

## 2019-09-18 RX ADMIN — INSULIN LISPRO 4 UNITS: 100 INJECTION, SOLUTION INTRAVENOUS; SUBCUTANEOUS at 00:03

## 2019-09-18 RX ADMIN — HYDROMORPHONE HYDROCHLORIDE 0.5 MG: 10 INJECTION INTRAMUSCULAR; INTRAVENOUS; SUBCUTANEOUS at 20:38

## 2019-09-18 RX ADMIN — FLECAINIDE ACETATE TABLET 100 MG: 50 TABLET ORAL at 20:37

## 2019-09-18 RX ADMIN — SODIUM CHLORIDE, PRESERVATIVE FREE 10 ML: 5 INJECTION INTRAVENOUS at 20:42

## 2019-09-18 RX ADMIN — HYDROCODONE BITARTRATE AND ACETAMINOPHEN 1 TABLET: 7.5; 325 TABLET ORAL at 04:39

## 2019-09-18 RX ADMIN — ONDANSETRON 4 MG: 2 INJECTION INTRAMUSCULAR; INTRAVENOUS at 00:20

## 2019-09-18 RX ADMIN — HYDROMORPHONE HYDROCHLORIDE 0.5 MG: 10 INJECTION INTRAMUSCULAR; INTRAVENOUS; SUBCUTANEOUS at 09:17

## 2019-09-18 RX ADMIN — INSULIN GLARGINE 38 UNITS: 100 INJECTION, SOLUTION SUBCUTANEOUS at 00:02

## 2019-09-18 RX ADMIN — INSULIN GLARGINE 20 UNITS: 100 INJECTION, SOLUTION SUBCUTANEOUS at 21:42

## 2019-09-18 RX ADMIN — SODIUM CHLORIDE 125 ML/HR: 9 INJECTION, SOLUTION INTRAVENOUS at 22:02

## 2019-09-19 LAB
GLUCOSE BLDC GLUCOMTR-MCNC: 202 MG/DL (ref 70–130)
GLUCOSE BLDC GLUCOMTR-MCNC: 202 MG/DL (ref 70–130)
GLUCOSE BLDC GLUCOMTR-MCNC: 216 MG/DL (ref 70–130)
GLUCOSE BLDC GLUCOMTR-MCNC: 237 MG/DL (ref 70–130)

## 2019-09-19 PROCEDURE — 25010000002 ONDANSETRON PER 1 MG: Performed by: INTERNAL MEDICINE

## 2019-09-19 PROCEDURE — 93010 ELECTROCARDIOGRAM REPORT: CPT | Performed by: INTERNAL MEDICINE

## 2019-09-19 PROCEDURE — 93005 ELECTROCARDIOGRAM TRACING: CPT | Performed by: NURSE PRACTITIONER

## 2019-09-19 PROCEDURE — 99231 SBSQ HOSP IP/OBS SF/LOW 25: CPT | Performed by: NURSE PRACTITIONER

## 2019-09-19 PROCEDURE — 63710000001 INSULIN GLARGINE PER 5 UNITS: Performed by: INTERNAL MEDICINE

## 2019-09-19 PROCEDURE — 82962 GLUCOSE BLOOD TEST: CPT

## 2019-09-19 PROCEDURE — 25010000002 HYDROMORPHONE PER 4 MG: Performed by: HOSPITALIST

## 2019-09-19 PROCEDURE — 25010000002 HYDROMORPHONE PER 4 MG: Performed by: INTERNAL MEDICINE

## 2019-09-19 PROCEDURE — 63710000001 INSULIN LISPRO (HUMAN) PER 5 UNITS: Performed by: INTERNAL MEDICINE

## 2019-09-19 PROCEDURE — 99231 SBSQ HOSP IP/OBS SF/LOW 25: CPT | Performed by: ORTHOPAEDIC SURGERY

## 2019-09-19 RX ORDER — OXYCODONE AND ACETAMINOPHEN 10; 325 MG/1; MG/1
2 TABLET ORAL EVERY 4 HOURS PRN
Status: DISCONTINUED | OUTPATIENT
Start: 2019-09-19 | End: 2019-09-23 | Stop reason: HOSPADM

## 2019-09-19 RX ORDER — HYDROMORPHONE HYDROCHLORIDE 1 MG/ML
0.5 INJECTION, SOLUTION INTRAMUSCULAR; INTRAVENOUS; SUBCUTANEOUS
Status: DISCONTINUED | OUTPATIENT
Start: 2019-09-19 | End: 2019-09-23 | Stop reason: HOSPADM

## 2019-09-19 RX ORDER — MORPHINE SULFATE 20 MG/ML
20 SOLUTION ORAL
Status: DISCONTINUED | OUTPATIENT
Start: 2019-09-19 | End: 2019-09-23 | Stop reason: HOSPADM

## 2019-09-19 RX ORDER — HYDROMORPHONE HYDROCHLORIDE 1 MG/ML
1 INJECTION, SOLUTION INTRAMUSCULAR; INTRAVENOUS; SUBCUTANEOUS
Status: DISCONTINUED | OUTPATIENT
Start: 2019-09-19 | End: 2019-09-23 | Stop reason: HOSPADM

## 2019-09-19 RX ORDER — HYDROMORPHONE HYDROCHLORIDE 1 MG/ML
1.5 INJECTION, SOLUTION INTRAMUSCULAR; INTRAVENOUS; SUBCUTANEOUS
Status: DISCONTINUED | OUTPATIENT
Start: 2019-09-19 | End: 2019-09-23 | Stop reason: HOSPADM

## 2019-09-19 RX ORDER — MORPHINE SULFATE 2 MG/ML
4 INJECTION, SOLUTION INTRAMUSCULAR; INTRAVENOUS
Status: DISCONTINUED | OUTPATIENT
Start: 2019-09-19 | End: 2019-09-23 | Stop reason: HOSPADM

## 2019-09-19 RX ORDER — MORPHINE SULFATE 2 MG/ML
6 INJECTION, SOLUTION INTRAMUSCULAR; INTRAVENOUS
Status: DISCONTINUED | OUTPATIENT
Start: 2019-09-19 | End: 2019-09-23 | Stop reason: HOSPADM

## 2019-09-19 RX ORDER — MORPHINE SULFATE 2 MG/ML
2 INJECTION, SOLUTION INTRAMUSCULAR; INTRAVENOUS
Status: DISCONTINUED | OUTPATIENT
Start: 2019-09-19 | End: 2019-09-23 | Stop reason: HOSPADM

## 2019-09-19 RX ORDER — SENNA AND DOCUSATE SODIUM 50; 8.6 MG/1; MG/1
2 TABLET, FILM COATED ORAL NIGHTLY
Status: DISCONTINUED | OUTPATIENT
Start: 2019-09-19 | End: 2019-09-23 | Stop reason: HOSPADM

## 2019-09-19 RX ORDER — OXYCODONE AND ACETAMINOPHEN 10; 325 MG/1; MG/1
1 TABLET ORAL EVERY 4 HOURS PRN
Status: DISCONTINUED | OUTPATIENT
Start: 2019-09-19 | End: 2019-09-23 | Stop reason: HOSPADM

## 2019-09-19 RX ORDER — MORPHINE SULFATE 20 MG/ML
5 SOLUTION ORAL
Status: DISCONTINUED | OUTPATIENT
Start: 2019-09-19 | End: 2019-09-23 | Stop reason: HOSPADM

## 2019-09-19 RX ORDER — MORPHINE SULFATE 20 MG/ML
10 SOLUTION ORAL
Status: DISCONTINUED | OUTPATIENT
Start: 2019-09-19 | End: 2019-09-23 | Stop reason: HOSPADM

## 2019-09-19 RX ORDER — CASTOR OIL AND BALSAM, PERU 788; 87 MG/G; MG/G
OINTMENT TOPICAL EVERY 12 HOURS SCHEDULED
Status: DISCONTINUED | OUTPATIENT
Start: 2019-09-19 | End: 2019-09-23 | Stop reason: HOSPADM

## 2019-09-19 RX ADMIN — ONDANSETRON 4 MG: 2 INJECTION INTRAMUSCULAR; INTRAVENOUS at 06:03

## 2019-09-19 RX ADMIN — METOPROLOL SUCCINATE 25 MG: 25 TABLET, FILM COATED, EXTENDED RELEASE ORAL at 13:06

## 2019-09-19 RX ADMIN — HYDROCODONE BITARTRATE AND ACETAMINOPHEN 1 TABLET: 7.5; 325 TABLET ORAL at 06:03

## 2019-09-19 RX ADMIN — FLECAINIDE ACETATE TABLET 100 MG: 50 TABLET ORAL at 21:46

## 2019-09-19 RX ADMIN — SERTRALINE 150 MG: 100 TABLET, FILM COATED ORAL at 13:07

## 2019-09-19 RX ADMIN — GLYCERIN 1 DROP: .002; .002; .01 SOLUTION/ DROPS OPHTHALMIC at 13:12

## 2019-09-19 RX ADMIN — TRIAMCINOLONE ACETONIDE: 1 CREAM TOPICAL at 21:52

## 2019-09-19 RX ADMIN — FUROSEMIDE 20 MG: 20 TABLET ORAL at 13:06

## 2019-09-19 RX ADMIN — INSULIN LISPRO 2 UNITS: 100 INJECTION, SOLUTION INTRAVENOUS; SUBCUTANEOUS at 13:17

## 2019-09-19 RX ADMIN — FLUTICASONE PROPIONATE 2 SPRAY: 50 SPRAY, METERED NASAL at 13:03

## 2019-09-19 RX ADMIN — KETOTIFEN FUMARATE 1 DROP: 0.35 SOLUTION/ DROPS OPHTHALMIC at 21:39

## 2019-09-19 RX ADMIN — CASTOR OIL AND BALSAM, PERU 5 G: 788; 87 OINTMENT TOPICAL at 10:28

## 2019-09-19 RX ADMIN — INSULIN LISPRO 4 UNITS: 100 INJECTION, SOLUTION INTRAVENOUS; SUBCUTANEOUS at 18:51

## 2019-09-19 RX ADMIN — HYDROMORPHONE HYDROCHLORIDE 0.5 MG: 1 INJECTION, SOLUTION INTRAMUSCULAR; INTRAVENOUS; SUBCUTANEOUS at 15:06

## 2019-09-19 RX ADMIN — MICONAZOLE NITRATE: 2 POWDER TOPICAL at 21:47

## 2019-09-19 RX ADMIN — LISINOPRIL 10 MG: 10 TABLET ORAL at 13:06

## 2019-09-19 RX ADMIN — SODIUM CHLORIDE, PRESERVATIVE FREE 10 ML: 5 INJECTION INTRAVENOUS at 21:49

## 2019-09-19 RX ADMIN — INSULIN LISPRO 4 UNITS: 100 INJECTION, SOLUTION INTRAVENOUS; SUBCUTANEOUS at 21:41

## 2019-09-19 RX ADMIN — ASPIRIN 81 MG: 81 TABLET, COATED ORAL at 13:02

## 2019-09-19 RX ADMIN — SODIUM CHLORIDE 50 ML/HR: 9 INJECTION, SOLUTION INTRAVENOUS at 20:46

## 2019-09-19 RX ADMIN — INSULIN GLARGINE 38 UNITS: 100 INJECTION, SOLUTION SUBCUTANEOUS at 21:41

## 2019-09-19 RX ADMIN — HYDROCODONE BITARTRATE AND ACETAMINOPHEN 1 TABLET: 7.5; 325 TABLET ORAL at 00:20

## 2019-09-19 RX ADMIN — GABAPENTIN 100 MG: 100 CAPSULE ORAL at 21:39

## 2019-09-19 RX ADMIN — CASTOR OIL AND BALSAM, PERU 5 G: 788; 87 OINTMENT TOPICAL at 21:48

## 2019-09-19 RX ADMIN — SODIUM CHLORIDE 125 ML/HR: 9 INJECTION, SOLUTION INTRAVENOUS at 06:03

## 2019-09-19 RX ADMIN — GLYCERIN 1 DROP: .002; .002; .01 SOLUTION/ DROPS OPHTHALMIC at 21:40

## 2019-09-19 RX ADMIN — HYDROMORPHONE HYDROCHLORIDE 1 MG: 1 INJECTION, SOLUTION INTRAMUSCULAR; INTRAVENOUS; SUBCUTANEOUS at 10:31

## 2019-09-19 RX ADMIN — ATORVASTATIN CALCIUM 20 MG: 20 TABLET, FILM COATED ORAL at 21:39

## 2019-09-19 RX ADMIN — HYDROMORPHONE HYDROCHLORIDE 0.5 MG: 10 INJECTION INTRAMUSCULAR; INTRAVENOUS; SUBCUTANEOUS at 09:32

## 2019-09-19 RX ADMIN — OXYCODONE HYDROCHLORIDE AND ACETAMINOPHEN 1 TABLET: 10; 325 TABLET ORAL at 18:51

## 2019-09-19 RX ADMIN — KETOTIFEN FUMARATE 1 DROP: 0.35 SOLUTION/ DROPS OPHTHALMIC at 13:08

## 2019-09-19 RX ADMIN — BUPROPION HYDROCHLORIDE 150 MG: 75 TABLET, FILM COATED ORAL at 13:03

## 2019-09-19 RX ADMIN — OXYCODONE HYDROCHLORIDE AND ACETAMINOPHEN 1 TABLET: 10; 325 TABLET ORAL at 13:03

## 2019-09-19 RX ADMIN — HYDROMORPHONE HYDROCHLORIDE 1 MG: 1 INJECTION, SOLUTION INTRAMUSCULAR; INTRAVENOUS; SUBCUTANEOUS at 21:38

## 2019-09-20 LAB
GLUCOSE BLDC GLUCOMTR-MCNC: 169 MG/DL (ref 70–130)
GLUCOSE BLDC GLUCOMTR-MCNC: 181 MG/DL (ref 70–130)
GLUCOSE BLDC GLUCOMTR-MCNC: 194 MG/DL (ref 70–130)
GLUCOSE BLDC GLUCOMTR-MCNC: 219 MG/DL (ref 70–130)

## 2019-09-20 PROCEDURE — 63710000001 INSULIN LISPRO (HUMAN) PER 5 UNITS: Performed by: INTERNAL MEDICINE

## 2019-09-20 PROCEDURE — 63710000001 INSULIN GLARGINE PER 5 UNITS: Performed by: INTERNAL MEDICINE

## 2019-09-20 PROCEDURE — 25010000002 HYDROMORPHONE PER 4 MG: Performed by: INTERNAL MEDICINE

## 2019-09-20 PROCEDURE — 25010000002 ONDANSETRON PER 1 MG: Performed by: INTERNAL MEDICINE

## 2019-09-20 PROCEDURE — 82962 GLUCOSE BLOOD TEST: CPT

## 2019-09-20 RX ADMIN — INSULIN LISPRO 2 UNITS: 100 INJECTION, SOLUTION INTRAVENOUS; SUBCUTANEOUS at 17:47

## 2019-09-20 RX ADMIN — CASTOR OIL AND BALSAM, PERU 5 G: 788; 87 OINTMENT TOPICAL at 21:00

## 2019-09-20 RX ADMIN — SERTRALINE 150 MG: 100 TABLET, FILM COATED ORAL at 09:04

## 2019-09-20 RX ADMIN — OXYCODONE HYDROCHLORIDE AND ACETAMINOPHEN 1 TABLET: 10; 325 TABLET ORAL at 13:03

## 2019-09-20 RX ADMIN — SENNOSIDES AND DOCUSATE SODIUM 2 TABLET: 8.6; 5 TABLET ORAL at 21:00

## 2019-09-20 RX ADMIN — SODIUM CHLORIDE, PRESERVATIVE FREE 10 ML: 5 INJECTION INTRAVENOUS at 22:15

## 2019-09-20 RX ADMIN — SODIUM CHLORIDE 50 ML/HR: 9 INJECTION, SOLUTION INTRAVENOUS at 15:51

## 2019-09-20 RX ADMIN — FLECAINIDE ACETATE TABLET 100 MG: 50 TABLET ORAL at 21:00

## 2019-09-20 RX ADMIN — LORAZEPAM 0.5 MG: 0.5 TABLET ORAL at 09:19

## 2019-09-20 RX ADMIN — FLUTICASONE PROPIONATE 2 SPRAY: 50 SPRAY, METERED NASAL at 09:05

## 2019-09-20 RX ADMIN — KETOTIFEN FUMARATE 1 DROP: 0.35 SOLUTION/ DROPS OPHTHALMIC at 09:06

## 2019-09-20 RX ADMIN — TRIAMCINOLONE ACETONIDE: 1 CREAM TOPICAL at 12:27

## 2019-09-20 RX ADMIN — KETOTIFEN FUMARATE 1 DROP: 0.35 SOLUTION/ DROPS OPHTHALMIC at 21:02

## 2019-09-20 RX ADMIN — ASPIRIN 81 MG: 81 TABLET, COATED ORAL at 09:04

## 2019-09-20 RX ADMIN — MICONAZOLE NITRATE: 2 POWDER TOPICAL at 12:27

## 2019-09-20 RX ADMIN — TRIAMCINOLONE ACETONIDE: 1 CREAM TOPICAL at 21:00

## 2019-09-20 RX ADMIN — GABAPENTIN 100 MG: 100 CAPSULE ORAL at 21:40

## 2019-09-20 RX ADMIN — FUROSEMIDE 20 MG: 20 TABLET ORAL at 09:05

## 2019-09-20 RX ADMIN — INSULIN LISPRO 2 UNITS: 100 INJECTION, SOLUTION INTRAVENOUS; SUBCUTANEOUS at 21:41

## 2019-09-20 RX ADMIN — CASTOR OIL AND BALSAM, PERU 5 G: 788; 87 OINTMENT TOPICAL at 12:26

## 2019-09-20 RX ADMIN — OXYCODONE HYDROCHLORIDE AND ACETAMINOPHEN 2 TABLET: 10; 325 TABLET ORAL at 21:40

## 2019-09-20 RX ADMIN — GLYCERIN 1 DROP: .002; .002; .01 SOLUTION/ DROPS OPHTHALMIC at 09:07

## 2019-09-20 RX ADMIN — LISINOPRIL 10 MG: 10 TABLET ORAL at 09:04

## 2019-09-20 RX ADMIN — OXYCODONE HYDROCHLORIDE AND ACETAMINOPHEN 1 TABLET: 10; 325 TABLET ORAL at 01:56

## 2019-09-20 RX ADMIN — INSULIN LISPRO 2 UNITS: 100 INJECTION, SOLUTION INTRAVENOUS; SUBCUTANEOUS at 13:04

## 2019-09-20 RX ADMIN — CASTOR OIL AND BALSAM, PERU 5 G: 788; 87 OINTMENT TOPICAL at 12:27

## 2019-09-20 RX ADMIN — CASTOR OIL AND BALSAM, PERU 5 G: 788; 87 OINTMENT TOPICAL at 20:59

## 2019-09-20 RX ADMIN — BUPROPION HYDROCHLORIDE 150 MG: 75 TABLET, FILM COATED ORAL at 09:05

## 2019-09-20 RX ADMIN — GLYCERIN 1 DROP: .002; .002; .01 SOLUTION/ DROPS OPHTHALMIC at 21:01

## 2019-09-20 RX ADMIN — INSULIN LISPRO 4 UNITS: 100 INJECTION, SOLUTION INTRAVENOUS; SUBCUTANEOUS at 09:18

## 2019-09-20 RX ADMIN — OXYCODONE HYDROCHLORIDE AND ACETAMINOPHEN 1 TABLET: 10; 325 TABLET ORAL at 09:01

## 2019-09-20 RX ADMIN — ONDANSETRON 4 MG: 2 INJECTION INTRAMUSCULAR; INTRAVENOUS at 15:38

## 2019-09-20 RX ADMIN — METOPROLOL SUCCINATE 25 MG: 25 TABLET, FILM COATED, EXTENDED RELEASE ORAL at 09:04

## 2019-09-20 RX ADMIN — MICONAZOLE NITRATE: 2 POWDER TOPICAL at 21:03

## 2019-09-20 RX ADMIN — HYDROMORPHONE HYDROCHLORIDE 0.5 MG: 10 INJECTION INTRAMUSCULAR; INTRAVENOUS; SUBCUTANEOUS at 15:07

## 2019-09-20 RX ADMIN — INSULIN GLARGINE 38 UNITS: 100 INJECTION, SOLUTION SUBCUTANEOUS at 21:41

## 2019-09-20 RX ADMIN — OXYCODONE HYDROCHLORIDE AND ACETAMINOPHEN 2 TABLET: 10; 325 TABLET ORAL at 17:47

## 2019-09-21 LAB
GLUCOSE BLDC GLUCOMTR-MCNC: 148 MG/DL (ref 70–130)
GLUCOSE BLDC GLUCOMTR-MCNC: 160 MG/DL (ref 70–130)
GLUCOSE BLDC GLUCOMTR-MCNC: 161 MG/DL (ref 70–130)
GLUCOSE BLDC GLUCOMTR-MCNC: 187 MG/DL (ref 70–130)

## 2019-09-21 PROCEDURE — 63710000001 INSULIN LISPRO (HUMAN) PER 5 UNITS: Performed by: INTERNAL MEDICINE

## 2019-09-21 PROCEDURE — 63710000001 INSULIN GLARGINE PER 5 UNITS: Performed by: INTERNAL MEDICINE

## 2019-09-21 PROCEDURE — 25010000002 ENOXAPARIN PER 10 MG: Performed by: HOSPITALIST

## 2019-09-21 PROCEDURE — 82962 GLUCOSE BLOOD TEST: CPT

## 2019-09-21 RX ORDER — MORPHINE SULFATE 15 MG/1
15 TABLET, FILM COATED, EXTENDED RELEASE ORAL EVERY 8 HOURS SCHEDULED
Status: DISCONTINUED | OUTPATIENT
Start: 2019-09-21 | End: 2019-09-23 | Stop reason: HOSPADM

## 2019-09-21 RX ORDER — METOPROLOL SUCCINATE 25 MG/1
37.5 TABLET, EXTENDED RELEASE ORAL
Status: DISCONTINUED | OUTPATIENT
Start: 2019-09-22 | End: 2019-09-23 | Stop reason: HOSPADM

## 2019-09-21 RX ADMIN — KETOTIFEN FUMARATE 1 DROP: 0.35 SOLUTION/ DROPS OPHTHALMIC at 09:39

## 2019-09-21 RX ADMIN — SENNOSIDES AND DOCUSATE SODIUM 2 TABLET: 8.6; 5 TABLET ORAL at 20:51

## 2019-09-21 RX ADMIN — CASTOR OIL AND BALSAM, PERU 5 G: 788; 87 OINTMENT TOPICAL at 09:39

## 2019-09-21 RX ADMIN — ENOXAPARIN SODIUM 40 MG: 40 INJECTION SUBCUTANEOUS at 21:09

## 2019-09-21 RX ADMIN — ATORVASTATIN CALCIUM 20 MG: 20 TABLET, FILM COATED ORAL at 20:51

## 2019-09-21 RX ADMIN — MORPHINE SULFATE 15 MG: 15 TABLET, FILM COATED, EXTENDED RELEASE ORAL at 15:38

## 2019-09-21 RX ADMIN — GABAPENTIN 100 MG: 100 CAPSULE ORAL at 20:51

## 2019-09-21 RX ADMIN — FLUTICASONE PROPIONATE 2 SPRAY: 50 SPRAY, METERED NASAL at 09:39

## 2019-09-21 RX ADMIN — MORPHINE SULFATE 15 MG: 15 TABLET, FILM COATED, EXTENDED RELEASE ORAL at 21:09

## 2019-09-21 RX ADMIN — CASTOR OIL AND BALSAM, PERU 5 G: 788; 87 OINTMENT TOPICAL at 20:52

## 2019-09-21 RX ADMIN — MICONAZOLE NITRATE: 2 POWDER TOPICAL at 20:50

## 2019-09-21 RX ADMIN — ASPIRIN 81 MG: 81 TABLET, COATED ORAL at 09:39

## 2019-09-21 RX ADMIN — SODIUM CHLORIDE 50 ML/HR: 9 INJECTION, SOLUTION INTRAVENOUS at 11:59

## 2019-09-21 RX ADMIN — INSULIN GLARGINE 38 UNITS: 100 INJECTION, SOLUTION SUBCUTANEOUS at 21:16

## 2019-09-21 RX ADMIN — TRIAMCINOLONE ACETONIDE: 1 CREAM TOPICAL at 20:51

## 2019-09-21 RX ADMIN — CASTOR OIL AND BALSAM, PERU 5 G: 788; 87 OINTMENT TOPICAL at 09:40

## 2019-09-21 RX ADMIN — BUPROPION HYDROCHLORIDE 150 MG: 75 TABLET, FILM COATED ORAL at 09:40

## 2019-09-21 RX ADMIN — KETOTIFEN FUMARATE 1 DROP: 0.35 SOLUTION/ DROPS OPHTHALMIC at 20:52

## 2019-09-21 RX ADMIN — OXYCODONE HYDROCHLORIDE AND ACETAMINOPHEN 2 TABLET: 10; 325 TABLET ORAL at 10:14

## 2019-09-21 RX ADMIN — INSULIN LISPRO 2 UNITS: 100 INJECTION, SOLUTION INTRAVENOUS; SUBCUTANEOUS at 21:17

## 2019-09-21 RX ADMIN — SODIUM CHLORIDE, PRESERVATIVE FREE 10 ML: 5 INJECTION INTRAVENOUS at 09:40

## 2019-09-21 RX ADMIN — TRIAMCINOLONE ACETONIDE: 1 CREAM TOPICAL at 09:39

## 2019-09-21 RX ADMIN — MICONAZOLE NITRATE: 2 POWDER TOPICAL at 09:40

## 2019-09-21 RX ADMIN — FLECAINIDE ACETATE TABLET 100 MG: 50 TABLET ORAL at 20:51

## 2019-09-21 RX ADMIN — ENOXAPARIN SODIUM 40 MG: 40 INJECTION SUBCUTANEOUS at 10:14

## 2019-09-21 RX ADMIN — SERTRALINE 150 MG: 100 TABLET, FILM COATED ORAL at 09:40

## 2019-09-21 RX ADMIN — GLYCERIN 1 DROP: .002; .002; .01 SOLUTION/ DROPS OPHTHALMIC at 20:58

## 2019-09-21 RX ADMIN — CASTOR OIL AND BALSAM, PERU 5 G: 788; 87 OINTMENT TOPICAL at 20:51

## 2019-09-21 RX ADMIN — OXYCODONE HYDROCHLORIDE AND ACETAMINOPHEN 2 TABLET: 10; 325 TABLET ORAL at 06:20

## 2019-09-21 RX ADMIN — GLYCERIN 1 DROP: .002; .002; .01 SOLUTION/ DROPS OPHTHALMIC at 09:39

## 2019-09-21 RX ADMIN — FUROSEMIDE 20 MG: 20 TABLET ORAL at 09:40

## 2019-09-21 RX ADMIN — LISINOPRIL 10 MG: 10 TABLET ORAL at 09:40

## 2019-09-21 RX ADMIN — SODIUM CHLORIDE, PRESERVATIVE FREE 10 ML: 5 INJECTION INTRAVENOUS at 20:58

## 2019-09-21 RX ADMIN — METOPROLOL SUCCINATE 25 MG: 25 TABLET, FILM COATED, EXTENDED RELEASE ORAL at 09:41

## 2019-09-22 LAB
GLUCOSE BLDC GLUCOMTR-MCNC: 100 MG/DL (ref 70–130)
GLUCOSE BLDC GLUCOMTR-MCNC: 121 MG/DL (ref 70–130)
GLUCOSE BLDC GLUCOMTR-MCNC: 77 MG/DL (ref 70–130)

## 2019-09-22 PROCEDURE — 25010000002 ENOXAPARIN PER 10 MG: Performed by: HOSPITALIST

## 2019-09-22 PROCEDURE — 63710000001 INSULIN LISPRO (HUMAN) PER 5 UNITS: Performed by: INTERNAL MEDICINE

## 2019-09-22 PROCEDURE — 63710000001 INSULIN GLARGINE PER 5 UNITS: Performed by: INTERNAL MEDICINE

## 2019-09-22 PROCEDURE — 82962 GLUCOSE BLOOD TEST: CPT

## 2019-09-22 RX ADMIN — GLYCERIN 1 DROP: .002; .002; .01 SOLUTION/ DROPS OPHTHALMIC at 21:49

## 2019-09-22 RX ADMIN — ENOXAPARIN SODIUM 40 MG: 40 INJECTION SUBCUTANEOUS at 11:04

## 2019-09-22 RX ADMIN — SENNOSIDES AND DOCUSATE SODIUM 2 TABLET: 8.6; 5 TABLET ORAL at 21:48

## 2019-09-22 RX ADMIN — INSULIN LISPRO 3 UNITS: 100 INJECTION, SOLUTION INTRAVENOUS; SUBCUTANEOUS at 11:05

## 2019-09-22 RX ADMIN — ENOXAPARIN SODIUM 40 MG: 40 INJECTION SUBCUTANEOUS at 22:02

## 2019-09-22 RX ADMIN — KETOTIFEN FUMARATE 1 DROP: 0.35 SOLUTION/ DROPS OPHTHALMIC at 11:07

## 2019-09-22 RX ADMIN — GLYCERIN 1 DROP: .002; .002; .01 SOLUTION/ DROPS OPHTHALMIC at 11:05

## 2019-09-22 RX ADMIN — TRIAMCINOLONE ACETONIDE: 1 CREAM TOPICAL at 11:04

## 2019-09-22 RX ADMIN — KETOTIFEN FUMARATE 1 DROP: 0.35 SOLUTION/ DROPS OPHTHALMIC at 21:49

## 2019-09-22 RX ADMIN — FLUTICASONE PROPIONATE 2 SPRAY: 50 SPRAY, METERED NASAL at 11:05

## 2019-09-22 RX ADMIN — OXYCODONE HYDROCHLORIDE AND ACETAMINOPHEN 2 TABLET: 10; 325 TABLET ORAL at 01:41

## 2019-09-22 RX ADMIN — LISINOPRIL 10 MG: 10 TABLET ORAL at 11:01

## 2019-09-22 RX ADMIN — CASTOR OIL AND BALSAM, PERU 5 G: 788; 87 OINTMENT TOPICAL at 11:04

## 2019-09-22 RX ADMIN — MORPHINE SULFATE 15 MG: 15 TABLET, FILM COATED, EXTENDED RELEASE ORAL at 07:00

## 2019-09-22 RX ADMIN — SODIUM CHLORIDE 50 ML/HR: 9 INJECTION, SOLUTION INTRAVENOUS at 07:48

## 2019-09-22 RX ADMIN — MORPHINE SULFATE 15 MG: 15 TABLET, FILM COATED, EXTENDED RELEASE ORAL at 21:48

## 2019-09-22 RX ADMIN — ATORVASTATIN CALCIUM 20 MG: 20 TABLET, FILM COATED ORAL at 21:49

## 2019-09-22 RX ADMIN — FLECAINIDE ACETATE TABLET 100 MG: 50 TABLET ORAL at 21:49

## 2019-09-22 RX ADMIN — FUROSEMIDE 20 MG: 20 TABLET ORAL at 11:01

## 2019-09-22 RX ADMIN — TRIAMCINOLONE ACETONIDE: 1 CREAM TOPICAL at 21:52

## 2019-09-22 RX ADMIN — ASPIRIN 81 MG: 81 TABLET, COATED ORAL at 11:03

## 2019-09-22 RX ADMIN — MICONAZOLE NITRATE: 2 POWDER TOPICAL at 11:01

## 2019-09-22 RX ADMIN — SODIUM CHLORIDE, PRESERVATIVE FREE 10 ML: 5 INJECTION INTRAVENOUS at 21:51

## 2019-09-22 RX ADMIN — MICONAZOLE NITRATE: 2 POWDER TOPICAL at 21:50

## 2019-09-22 RX ADMIN — MORPHINE SULFATE 15 MG: 15 TABLET, FILM COATED, EXTENDED RELEASE ORAL at 14:53

## 2019-09-22 RX ADMIN — GABAPENTIN 100 MG: 100 CAPSULE ORAL at 21:48

## 2019-09-22 RX ADMIN — BUPROPION HYDROCHLORIDE 150 MG: 75 TABLET, FILM COATED ORAL at 11:02

## 2019-09-22 RX ADMIN — METOPROLOL SUCCINATE 37.5 MG: 25 TABLET, FILM COATED, EXTENDED RELEASE ORAL at 11:01

## 2019-09-22 RX ADMIN — SERTRALINE 150 MG: 100 TABLET, FILM COATED ORAL at 11:01

## 2019-09-22 RX ADMIN — INSULIN GLARGINE 38 UNITS: 100 INJECTION, SOLUTION SUBCUTANEOUS at 22:02

## 2019-09-22 RX ADMIN — CASTOR OIL AND BALSAM, PERU 5 G: 788; 87 OINTMENT TOPICAL at 21:49

## 2019-09-23 VITALS
TEMPERATURE: 98.6 F | HEART RATE: 75 BPM | DIASTOLIC BLOOD PRESSURE: 62 MMHG | OXYGEN SATURATION: 97 % | RESPIRATION RATE: 18 BRPM | HEIGHT: 61 IN | BODY MASS INDEX: 55.32 KG/M2 | WEIGHT: 293 LBS | SYSTOLIC BLOOD PRESSURE: 137 MMHG

## 2019-09-23 LAB
GLUCOSE BLDC GLUCOMTR-MCNC: 65 MG/DL (ref 70–130)
GLUCOSE BLDC GLUCOMTR-MCNC: 67 MG/DL (ref 70–130)
GLUCOSE BLDC GLUCOMTR-MCNC: 85 MG/DL (ref 70–130)
GLUCOSE BLDC GLUCOMTR-MCNC: 93 MG/DL (ref 70–130)

## 2019-09-23 PROCEDURE — 82962 GLUCOSE BLOOD TEST: CPT

## 2019-09-23 RX ORDER — OXYCODONE AND ACETAMINOPHEN 10; 325 MG/1; MG/1
1-2 TABLET ORAL EVERY 6 HOURS PRN
Start: 2019-09-23 | End: 2019-09-29

## 2019-09-23 RX ORDER — SENNA AND DOCUSATE SODIUM 50; 8.6 MG/1; MG/1
2 TABLET, FILM COATED ORAL NIGHTLY
Start: 2019-09-23

## 2019-09-23 RX ORDER — POLYETHYLENE GLYCOL 3350 17 G/17G
17 POWDER, FOR SOLUTION ORAL DAILY
Start: 2019-09-23

## 2019-09-23 RX ORDER — MORPHINE SULFATE 15 MG/1
15 TABLET, FILM COATED, EXTENDED RELEASE ORAL EVERY 8 HOURS SCHEDULED
Qty: 24 TABLET | Refills: 0
Start: 2019-09-23 | End: 2019-10-01

## 2019-09-23 RX ORDER — LORAZEPAM 0.5 MG/1
0.5 TABLET ORAL EVERY 8 HOURS PRN
Start: 2019-09-23 | End: 2019-09-30

## 2019-09-23 RX ORDER — METOPROLOL SUCCINATE 25 MG/1
37.5 TABLET, EXTENDED RELEASE ORAL
Start: 2019-09-23

## 2019-09-23 RX ORDER — BISACODYL 10 MG
10 SUPPOSITORY, RECTAL RECTAL DAILY PRN
Status: DISCONTINUED | OUTPATIENT
Start: 2019-09-23 | End: 2019-09-23 | Stop reason: HOSPADM

## 2019-09-23 RX ORDER — POLYETHYLENE GLYCOL 3350 17 G/17G
17 POWDER, FOR SOLUTION ORAL DAILY
Status: DISCONTINUED | OUTPATIENT
Start: 2019-09-23 | End: 2019-09-23 | Stop reason: HOSPADM

## 2019-09-23 RX ORDER — BISACODYL 10 MG
10 SUPPOSITORY, RECTAL RECTAL DAILY PRN
Start: 2019-09-23

## 2019-09-23 RX ADMIN — CASTOR OIL AND BALSAM, PERU 5 G: 788; 87 OINTMENT TOPICAL at 09:52

## 2019-09-23 RX ADMIN — MORPHINE SULFATE 15 MG: 15 TABLET, FILM COATED, EXTENDED RELEASE ORAL at 13:08

## 2019-09-23 RX ADMIN — SODIUM CHLORIDE 50 ML/HR: 9 INJECTION, SOLUTION INTRAVENOUS at 03:56

## 2019-09-23 RX ADMIN — POLYETHYLENE GLYCOL 3350 17 G: 17 POWDER, FOR SOLUTION ORAL at 09:55

## 2019-09-23 RX ADMIN — SERTRALINE 150 MG: 100 TABLET, FILM COATED ORAL at 09:49

## 2019-09-23 RX ADMIN — BISACODYL 10 MG: 10 SUPPOSITORY RECTAL at 09:48

## 2019-09-23 RX ADMIN — SODIUM CHLORIDE, PRESERVATIVE FREE 10 ML: 5 INJECTION INTRAVENOUS at 09:55

## 2019-09-23 RX ADMIN — MORPHINE SULFATE 15 MG: 15 TABLET, FILM COATED, EXTENDED RELEASE ORAL at 06:03

## 2019-09-23 RX ADMIN — CASTOR OIL AND BALSAM, PERU 5 G: 788; 87 OINTMENT TOPICAL at 09:50

## 2019-09-23 RX ADMIN — BUPROPION HYDROCHLORIDE 150 MG: 75 TABLET, FILM COATED ORAL at 09:51

## 2019-09-23 RX ADMIN — MICONAZOLE NITRATE: 2 POWDER TOPICAL at 09:53
